# Patient Record
Sex: FEMALE | Race: WHITE | NOT HISPANIC OR LATINO | Employment: OTHER | URBAN - METROPOLITAN AREA
[De-identification: names, ages, dates, MRNs, and addresses within clinical notes are randomized per-mention and may not be internally consistent; named-entity substitution may affect disease eponyms.]

---

## 2017-08-09 ENCOUNTER — GENERIC CONVERSION - ENCOUNTER (OUTPATIENT)
Dept: OTHER | Facility: OTHER | Age: 72
End: 2017-08-09

## 2018-02-02 ENCOUNTER — OFFICE VISIT (OUTPATIENT)
Dept: GASTROENTEROLOGY | Facility: CLINIC | Age: 73
End: 2018-02-02
Payer: MEDICARE

## 2018-02-02 VITALS
TEMPERATURE: 98.2 F | WEIGHT: 133 LBS | HEART RATE: 64 BPM | RESPIRATION RATE: 14 BRPM | DIASTOLIC BLOOD PRESSURE: 64 MMHG | HEIGHT: 67 IN | BODY MASS INDEX: 20.88 KG/M2 | SYSTOLIC BLOOD PRESSURE: 102 MMHG

## 2018-02-02 DIAGNOSIS — R10.13 DYSPEPSIA: Primary | ICD-10-CM

## 2018-02-02 DIAGNOSIS — R10.13 EPIGASTRIC PAIN: ICD-10-CM

## 2018-02-02 PROCEDURE — 99204 OFFICE O/P NEW MOD 45 MIN: CPT | Performed by: INTERNAL MEDICINE

## 2018-02-02 RX ORDER — PANTOPRAZOLE SODIUM 40 MG/1
40 TABLET, DELAYED RELEASE ORAL DAILY
COMMUNITY

## 2018-02-02 RX ORDER — LORATADINE 10 MG/1
10 TABLET ORAL AS NEEDED
COMMUNITY

## 2018-02-02 RX ORDER — FLUVOXAMINE MALEATE 100 MG
100 TABLET ORAL
COMMUNITY

## 2018-02-02 RX ORDER — MINERAL OIL 100 G/100G
1 OIL RECTAL ONCE
COMMUNITY

## 2018-02-02 RX ORDER — ACETAMINOPHEN 325 MG/1
325 TABLET ORAL EVERY 6 HOURS PRN
COMMUNITY

## 2018-02-02 RX ORDER — MEMANTINE HYDROCHLORIDE 10 MG/1
10 TABLET ORAL 2 TIMES DAILY
COMMUNITY

## 2018-02-02 RX ORDER — OXYBUTYNIN CHLORIDE 10 MG/1
10 TABLET, EXTENDED RELEASE ORAL
COMMUNITY

## 2018-02-02 RX ORDER — RIVASTIGMINE 9.5 MG/24H
1 PATCH, EXTENDED RELEASE TRANSDERMAL DAILY
COMMUNITY

## 2018-02-02 NOTE — ASSESSMENT & PLAN NOTE
Nonspecific discomfort when patient is upset and it appears to most likely functional     -continue Protonix and check right upper quadrant ultrasound

## 2018-02-02 NOTE — LETTER
February 2, 2018     [address]  [address]    Patient: Champ Simmons   YOB: 1945   Date of Visit: 2/2/2018       Dear Dr Ravinder Roberson: Thank you for referring Champ Simmons to me for evaluation  Below are my notes for this consultation  If you have questions, please do not hesitate to call me  I look forward to following your patient along with you  Sincerely,        Alcira Arevalo MD        CC: No Recipients  Alcira Arevalo MD  2/2/2018  6:52 PM  Sign at close encounter  Consultation - [address] [facility]  Champ Simmons 1945 female         Chief Complaint:  Nausea and epigastric pain    HPI:  53-year-old female with history of dementia was referred from [facility] because of episodes of epigastric discomfort associated with nausea  Patient was brought in by her POA Ms  Bette Law who is also a RN  She tells me that her symptoms are mostly when she is anxious and under stress  She has been on Protonix regularly  Patient also reports that she gets these episodes on and off  Denies any vomiting  Her appetite is fair  Regular bowel movements, denies any blood or mucus in the stool  REVIEW OF SYSTEMS: Review of Systems   Constitutional: Negative for activity change, appetite change, chills, diaphoresis, fatigue, fever and unexpected weight change  HENT: Negative for ear discharge, ear pain, facial swelling, hearing loss, nosebleeds, sore throat, tinnitus and voice change  Eyes: Negative for pain, discharge, redness, itching and visual disturbance  Respiratory: Negative for apnea, cough, chest tightness, shortness of breath and wheezing  Cardiovascular: Negative for chest pain and palpitations  Gastrointestinal:        As noted in HPI   Endocrine: Negative for cold intolerance, heat intolerance and polyuria  Genitourinary: Negative for difficulty urinating, dysuria, flank pain, hematuria and urgency  Musculoskeletal: Positive for arthralgias  Negative for back pain, gait problem, joint swelling and myalgias  Skin: Negative for rash and wound  Neurological: Negative for dizziness, tremors, seizures, speech difficulty, light-headedness, numbness and headaches  Hematological: Negative for adenopathy  Does not bruise/bleed easily  Psychiatric/Behavioral: Positive for behavioral problems, confusion and dysphoric mood  Negative for agitation  The patient is nervous/anxious  Past Medical History:   Diagnosis Date    Alzheimer disease type 4       History reviewed  No pertinent surgical history  Social History     Social History    Marital status: Single     Spouse name: N/A    Number of children: N/A    Years of education: N/A     Occupational History    Not on file  Social History Main Topics    Smoking status: Never Smoker    Smokeless tobacco: Never Used    Alcohol use No    Drug use: No    Sexual activity: Not on file     Other Topics Concern    Not on file     Social History Narrative    No narrative on file     History reviewed  No pertinent family history  Patient has no known allergies    Current Outpatient Prescriptions   Medication Sig Dispense Refill    acetaminophen (TYLENOL) 325 mg tablet Take 325 mg by mouth every 6 (six) hours as needed for mild pain      bisacodyl (FLEET) 10 MG/30ML ENEM Insert 10 mg into the rectum once      fluvoxaMINE (LUVOX) 100 mg tablet Take 100 mg by mouth daily at bedtime      loratadine (CLARITIN) 10 mg tablet Take 10 mg by mouth as needed for allergies      magnesium hydroxide (MILK OF MAGNESIA) 400 mg/5 mL oral suspension Take 5 mL by mouth daily as needed for constipation      memantine (NAMENDA) 10 mg tablet Take 10 mg by mouth 2 (two) times a day      mineral oil enema Insert 1 enema into the rectum once      oxybutynin (DITROPAN-XL) 10 MG 24 hr tablet Take 10 mg by mouth daily at bedtime      pantoprazole (PROTONIX) 40 mg tablet Take 40 mg by mouth daily      rivastigmine (EXELON) 9 5 mg/24 hr TD 24 hr patch Place 1 patch on the skin daily       No current facility-administered medications for this visit  Blood pressure 102/64, pulse 64, temperature 98 2 °F (36 8 °C), temperature source Tympanic, resp  rate 14, height 5' 7" (1 702 m), weight 60 3 kg (133 lb)  PHYSICAL EXAM: Physical Exam   Constitutional: She is oriented to person, place, and time  She appears well-developed and well-nourished  HENT:   Head: Normocephalic and atraumatic  Nose: Nose normal    Mouth/Throat: Oropharynx is clear and moist    Eyes: Conjunctivae are normal  Right eye exhibits no discharge  Left eye exhibits no discharge  No scleral icterus  Neck: Neck supple  No JVD present  No tracheal deviation present  No thyromegaly present  Cardiovascular: Normal rate, regular rhythm and normal heart sounds  Exam reveals no gallop and no friction rub  No murmur heard  Pulmonary/Chest: Effort normal and breath sounds normal  No respiratory distress  She has no wheezes  She has no rales  She exhibits no tenderness  Abdominal: Soft  Bowel sounds are normal  She exhibits no distension and no mass  There is no tenderness  There is no rebound and no guarding  No hernia  Musculoskeletal: She exhibits no edema, tenderness or deformity  Lymphadenopathy:     She has no cervical adenopathy  Neurological: She is alert and oriented to person, place, and time  Skin: Skin is warm and dry  No rash noted  No erythema  Psychiatric: She has a normal mood and affect   Her behavior is normal  Thought content normal         Lab Results   Component Value Date    WBC 6 40 07/22/2016    HGB 12 2 07/22/2016    HCT 35 8 (L) 07/22/2016    MCV 92 07/22/2016     07/22/2016     Lab Results   Component Value Date    GLUCOSE 99 07/22/2016    CALCIUM 8 3 07/22/2016     07/22/2016    K 3 8 07/22/2016    CO2 31 07/22/2016     07/22/2016    BUN 13 07/22/2016 CREATININE 0 81 07/22/2016     Lab Results   Component Value Date    ALT 21 07/22/2016    AST 20 07/22/2016    ALKPHOS 37 (L) 07/22/2016    BILITOT 0 50 07/22/2016     No results found for: INR, PROTIME    Patient was never admitted  ASSESSMENT & PLAN:    Dyspepsia  Symptoms appear to be most likely functional related to stress and anxiety as per patient's POA  Rule out peptic ulcer disease or gastric erosions which seems to be less likely as she has been on proton pump inhibitors    Possible biliary pathology     -discussed with patient's POA about upper endoscopy evaluation but she is refusing at this time as patient does not want to go through any invasive procedures     -check right upper quadrant ultrasound    -check upper GI barium    -continue Protonix for now but consider changing to Pepcid or Zantac because of the concerns regarding long term side effects        Epigastric pain  Nonspecific discomfort when patient is upset and it appears to most likely functional     -continue Protonix and check right upper quadrant ultrasound

## 2018-02-02 NOTE — PROGRESS NOTES
Consultation - [facility]  Justin Singh 1945 female         Chief Complaint:  Nausea and epigastric pain    HPI:  74-year-old female with history of dementia was referred from [facility] because of episodes of epigastric discomfort associated with nausea  Patient was brought in by her POA Ms Timoteo Nunez who is also a RN  She tells me that her symptoms are mostly when she is anxious and under stress  She has been on Protonix regularly  Patient also reports that she gets these episodes on and off  Denies any vomiting  Her appetite is fair  Regular bowel movements, denies any blood or mucus in the stool  REVIEW OF SYSTEMS: Review of Systems   Constitutional: Negative for activity change, appetite change, chills, diaphoresis, fatigue, fever and unexpected weight change  HENT: Negative for ear discharge, ear pain, facial swelling, hearing loss, nosebleeds, sore throat, tinnitus and voice change  Eyes: Negative for pain, discharge, redness, itching and visual disturbance  Respiratory: Negative for apnea, cough, chest tightness, shortness of breath and wheezing  Cardiovascular: Negative for chest pain and palpitations  Gastrointestinal:        As noted in HPI   Endocrine: Negative for cold intolerance, heat intolerance and polyuria  Genitourinary: Negative for difficulty urinating, dysuria, flank pain, hematuria and urgency  Musculoskeletal: Positive for arthralgias  Negative for back pain, gait problem, joint swelling and myalgias  Skin: Negative for rash and wound  Neurological: Negative for dizziness, tremors, seizures, speech difficulty, light-headedness, numbness and headaches  Hematological: Negative for adenopathy  Does not bruise/bleed easily  Psychiatric/Behavioral: Positive for behavioral problems, confusion and dysphoric mood  Negative for agitation  The patient is nervous/anxious           Past Medical History:   Diagnosis Date    Alzheimer disease type 4       History reviewed  No pertinent surgical history  Social History     Social History    Marital status: Single     Spouse name: N/A    Number of children: N/A    Years of education: N/A     Occupational History    Not on file  Social History Main Topics    Smoking status: Never Smoker    Smokeless tobacco: Never Used    Alcohol use No    Drug use: No    Sexual activity: Not on file     Other Topics Concern    Not on file     Social History Narrative    No narrative on file     History reviewed  No pertinent family history  Patient has no known allergies  Current Outpatient Prescriptions   Medication Sig Dispense Refill    acetaminophen (TYLENOL) 325 mg tablet Take 325 mg by mouth every 6 (six) hours as needed for mild pain      bisacodyl (FLEET) 10 MG/30ML ENEM Insert 10 mg into the rectum once      fluvoxaMINE (LUVOX) 100 mg tablet Take 100 mg by mouth daily at bedtime      loratadine (CLARITIN) 10 mg tablet Take 10 mg by mouth as needed for allergies      magnesium hydroxide (MILK OF MAGNESIA) 400 mg/5 mL oral suspension Take 5 mL by mouth daily as needed for constipation      memantine (NAMENDA) 10 mg tablet Take 10 mg by mouth 2 (two) times a day      mineral oil enema Insert 1 enema into the rectum once      oxybutynin (DITROPAN-XL) 10 MG 24 hr tablet Take 10 mg by mouth daily at bedtime      pantoprazole (PROTONIX) 40 mg tablet Take 40 mg by mouth daily      rivastigmine (EXELON) 9 5 mg/24 hr TD 24 hr patch Place 1 patch on the skin daily       No current facility-administered medications for this visit  Blood pressure 102/64, pulse 64, temperature 98 2 °F (36 8 °C), temperature source Tympanic, resp  rate 14, height 5' 7" (1 702 m), weight 60 3 kg (133 lb)  PHYSICAL EXAM: Physical Exam   Constitutional: She is oriented to person, place, and time  She appears well-developed and well-nourished  HENT:   Head: Normocephalic and atraumatic     Nose: Nose normal    Mouth/Throat: Oropharynx is clear and moist    Eyes: Conjunctivae are normal  Right eye exhibits no discharge  Left eye exhibits no discharge  No scleral icterus  Neck: Neck supple  No JVD present  No tracheal deviation present  No thyromegaly present  Cardiovascular: Normal rate, regular rhythm and normal heart sounds  Exam reveals no gallop and no friction rub  No murmur heard  Pulmonary/Chest: Effort normal and breath sounds normal  No respiratory distress  She has no wheezes  She has no rales  She exhibits no tenderness  Abdominal: Soft  Bowel sounds are normal  She exhibits no distension and no mass  There is no tenderness  There is no rebound and no guarding  No hernia  Musculoskeletal: She exhibits no edema, tenderness or deformity  Lymphadenopathy:     She has no cervical adenopathy  Neurological: She is alert and oriented to person, place, and time  Skin: Skin is warm and dry  No rash noted  No erythema  Psychiatric: She has a normal mood and affect  Her behavior is normal  Thought content normal         Lab Results   Component Value Date    WBC 6 40 07/22/2016    HGB 12 2 07/22/2016    HCT 35 8 (L) 07/22/2016    MCV 92 07/22/2016     07/22/2016     Lab Results   Component Value Date    GLUCOSE 99 07/22/2016    CALCIUM 8 3 07/22/2016     07/22/2016    K 3 8 07/22/2016    CO2 31 07/22/2016     07/22/2016    BUN 13 07/22/2016    CREATININE 0 81 07/22/2016     Lab Results   Component Value Date    ALT 21 07/22/2016    AST 20 07/22/2016    ALKPHOS 37 (L) 07/22/2016    BILITOT 0 50 07/22/2016     No results found for: INR, PROTIME    Patient was never admitted  ASSESSMENT & PLAN:    Dyspepsia  Symptoms appear to be most likely functional related to stress and anxiety as per patient's POA  Rule out peptic ulcer disease or gastric erosions which seems to be less likely as she has been on proton pump inhibitors    Possible biliary pathology     -discussed with patient's POA about upper endoscopy evaluation but she is refusing at this time as patient does not want to go through any invasive procedures     -check right upper quadrant ultrasound    -check upper GI barium    -continue Protonix for now but consider changing to Pepcid or Zantac because of the concerns regarding long term side effects        Epigastric pain  Nonspecific discomfort when patient is upset and it appears to most likely functional     -continue Protonix and check right upper quadrant ultrasound

## 2018-02-02 NOTE — ASSESSMENT & PLAN NOTE
Symptoms appear to be most likely functional related to stress and anxiety as per patient's POA  Rule out peptic ulcer disease or gastric erosions which seems to be less likely as she has been on proton pump inhibitors    Possible biliary pathology     -discussed with patient's POA about upper endoscopy evaluation but she is refusing at this time as patient does not want to go through any invasive procedures     -check right upper quadrant ultrasound    -check upper GI barium    -continue Protonix for now but consider changing to Pepcid or Zantac because of the concerns regarding long term side effects

## 2018-02-12 ENCOUNTER — TELEPHONE (OUTPATIENT)
Dept: GASTROENTEROLOGY | Facility: CLINIC | Age: 73
End: 2018-02-12

## 2018-02-12 NOTE — PROGRESS NOTES
Called to asked of her status for the test requested by Dr Alex De Guzman  PT has not responded to any of my calls  02/12/18 [id number]

## 2018-09-26 ENCOUNTER — HOSPITAL ENCOUNTER (OUTPATIENT)
Facility: AMBULARY SURGERY CENTER | Age: 73
Setting detail: OUTPATIENT SURGERY
End: 2018-09-26
Attending: SPECIALIST | Admitting: SPECIALIST
Payer: MEDICARE

## 2018-09-26 VITALS
TEMPERATURE: 98.1 F | WEIGHT: 135 LBS | RESPIRATION RATE: 16 BRPM | DIASTOLIC BLOOD PRESSURE: 58 MMHG | SYSTOLIC BLOOD PRESSURE: 112 MMHG | HEART RATE: 61 BPM | OXYGEN SATURATION: 93 % | HEIGHT: 69 IN | BODY MASS INDEX: 19.99 KG/M2

## 2018-09-26 DIAGNOSIS — C44.42 SQUAMOUS CELL CARCINOMA OF SKIN OF SCALP AND NECK: ICD-10-CM

## 2018-09-26 PROBLEM — L81.9: Status: ACTIVE | Noted: 2018-09-26

## 2018-09-26 PROCEDURE — 88305 TISSUE EXAM BY PATHOLOGIST: CPT | Performed by: PATHOLOGY

## 2018-09-26 PROCEDURE — 88312 SPECIAL STAINS GROUP 1: CPT | Performed by: PATHOLOGY

## 2018-09-26 RX ORDER — LIDOCAINE HYDROCHLORIDE AND EPINEPHRINE 10; 10 MG/ML; UG/ML
INJECTION, SOLUTION INFILTRATION; PERINEURAL AS NEEDED
Status: DISCONTINUED | OUTPATIENT
Start: 2018-09-26 | End: 2018-09-26 | Stop reason: HOSPADM

## 2018-09-26 NOTE — OP NOTE
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 [C44 42]    Procedure(s): MINOR PROCEDURE - EXCISION OF SQUAMOUS CELL CA ON SCALP    Surgeon(s):  Donna Brown MD    Anesthesia:  Local    Staff:   Circulator: Edna Robles RN    Operative Findings:  Nonhealing ulcerated lesion over the forehead for 1 year increasing in size  Size Of lesion   2 cm x  0 5 Cm     OPERATIVE TECHNIQUE    Jyoti Gamez was identified by me  Proper detailed consent was obtained from patient risk and benefits were explained to patient and family in detail prior to coming to Operating Room  Site was marked  Jyoti Gamez was given pre-operative antibiotics  Body mass index is 19 94 kg/m²  Jyoti Gamez is ASA 2 and Fire risk- 1  Jyoti Gamez was re-identified in the OR  Site was identified and detailed timeout was performed with Anesthesia and OR staff  The lesion was marked the local anesthetic lidocaine with epinephrine approximately 2 cc was injected the under the lesion with 1 cm clear margin of the lesion was excised with the scalpel  The skin subcutaneous cutaneous tissue was cut along the line of incision  The lesion was completely excised   Proper hemostasis was achieved     Resultant wound was then closed in single layer approximated with nylon 4-0 sutures    No active bleeding was noted at the end of the procedure    Jyoti Gamez tolerated the procedure well, remain stable during and after the procedure  At the end of the procedure No active bleeding was noted and all the instruments, needle and sponge counts were noted to be correct  Sterile dresing was applied and patient was transfered to recovery area in stable condition  Estimated Blood Loss:  Minimal    Specimens:    Order Name Source Comment Collection Info Order Time   TISSUE EXAM Head  Collected By: Vandana Patel MD 9/26/2018 10:32 AM         Drains:   nil    Complications:  None    Total OR Time  0 Hr 16 Min 0 Sec   or    Vandana Patel MD MS Taran Font    Date: 9/26/2018  Time: 10:48 AM    Copy to PCP: Yen Clark MD

## 2018-09-26 NOTE — DISCHARGE INSTRUCTIONS
Scalp Lesion   WHAT YOU NEED TO KNOW:   What do I need to know about a scalp lesion? A scalp lesion is a bump, blister, growth, or scaly patch  A lesion can also be an area of skin with a different color or texture than the skin around it  You can have a lesion anywhere on your scalp  It may itch, bleed, hurt, or be filled with fluid  Your hair may break off or come out around your scalp lesion  What can cause a scalp lesion? The cause of a scalp lesion may not be known, or it may be caused by any of the following:  · Injury, such as a hit on the head    · Bacterial, viral, or fungal infections, or cancer    · Chemicals in shampoos or hair products    · Vitamin deficiencies, inflammatory skin conditions, or poor circulation of blood  How is a scalp lesion diagnosed? A scalp lesion is diagnosed by your healthcare provider looking at your scalp  A sample of fluid from your lesion may show bacteria, a virus, or a fungus  A biopsy may show if your scalp lesion is caused by cancer  How are scalp lesions treated? Treatment depends on the cause of your skin lesion  You may need medicine to treat a fungal or bacterial infection  You may also need medicated shampoos and creams to treat your scalp lesions  You may need to have light therapy  Surgery may be needed to remove the lesion  How can I manage my scalp lesion? · Use a soft brush  Brush your hair gently to keep your scalp lesion from bleeding or becoming irritated  · Do not scratch your scalp lesion  You may cause your scalp to bleed  You may also spread bacteria or infection to other parts of your scalp  · Do not use chemicals or colors on your hair  Color and chemical treatments to your hair may make your scalp lesion worse  Wait until your scalp lesion is healed or until your healthcare provider says it is okay  · Use a mild shampoo to wash your hair    If you have not been told to use a medicated shampoo, ask your healthcare provider which shampoo is best     · Always wear sunscreen or a wide-brimmed hat when out in the sun  This will help prevent skin cancer on your scalp  When should I contact my healthcare provider? · Your scalp lesion gets worse, even with treatment  · Your lesion grows, is painful, or starts to drain fluid  · You have questions or concerns about your condition or care  CARE AGREEMENT:   You have the right to help plan your care  Learn about your health condition and how it may be treated  Discuss treatment options with your caregivers to decide what care you want to receive  You always have the right to refuse treatment  The above information is an  only  It is not intended as medical advice for individual conditions or treatments  Talk to your doctor, nurse or pharmacist before following any medical regimen to see if it is safe and effective for you  © 2017 [employer]  Information is for End User's use only and may not be sold, redistributed or otherwise used for commercial purposes  All illustrations and images included in CareNotes® are the copyrighted property of A D A M , Inc  or Steven Bustillo  Please follow carefully all instructions checked below  Salvador Holloway  Pre-op Diagnosis:   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 [C44 42]  Post-op Diagnosis:  * No post-op diagnosis entered *   Post-Op Diagnosis Codes:     * 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 [C44 42]  Procedure(s): MINOR PROCEDURE - EXCISION OF SQUAMOUS CELL CA ON SCALP  Surgeon(s):  Vinh Garcia MD    1  Diet:  · Resume your normal diet  Avoid red meat eat lots of yogurt  2  Medications:  · Home medications reviewed  Resume pre-operative medications unless noted below  · Prescription sent home with patient and Prescription sent over to pharmacy  · See after visit summary for reconciled discharge medications provided to patient and family      3  Activities:  · Do NOT make important personal or business decisions for 24 hours  · Do NOT drive or operate machinery for 7 days  · While taking pain medication, do not drive and be careful as you walk or climb stairs  · Limit your activities for 3 weeks  Do not engage in sports, heavy work or heavy  lifting until your physician gives you permission  4  Wound Care:  · Change dressings as necessary after 2 days  · Keep dressings dry  5  Special Instructions:  · Full weight bearing  6  Bathing:  · May shower after 2 days  7  When to Call:       Call if fever, Nausea, vomiting, Constipation not feeling well, or wound infection,      bleeding,reddness and excessive pain,  8  Follow-up Care:  · Make an appointment to see MD Minerva Garcia[address]  in 10 days for Follow up   Please Call Office  [phone] for appointment,   · Call if fever, Nausea, vomiting, Constipation not feeling well, or wound infection, bleeding,reddness and excessive pain,    Donna Brown MD [facility] FACS  01/03/18  2:24 PM

## 2020-04-20 ENCOUNTER — TELEPHONE (OUTPATIENT)
Dept: BEHAVIORAL/MENTAL HEALTH CLINIC | Facility: CLINIC | Age: 75
End: 2020-04-20

## 2021-09-16 ENCOUNTER — TELEPHONE (OUTPATIENT)
Dept: FAMILY MEDICINE CLINIC | Facility: CLINIC | Age: 76
End: 2021-09-16

## 2021-09-16 NOTE — TELEPHONE ENCOUNTER
Fax received with minimal info- states she has a R knee abrasion  Unsure what to do with this other than alert you  Will place in folder for you      Thank you

## 2022-01-21 ENCOUNTER — TELEPHONE (OUTPATIENT)
Dept: FAMILY MEDICINE CLINIC | Facility: CLINIC | Age: 77
End: 2022-01-21

## 2022-01-21 NOTE — TELEPHONE ENCOUNTER
We got a physician order to be signed from [address] [place] [address]    Copy scanned/media    Original left in blue

## 2022-10-17 ENCOUNTER — TELEPHONE (OUTPATIENT)
Dept: FAMILY MEDICINE CLINIC | Facility: CLINIC | Age: 77
End: 2022-10-17

## 2022-10-17 NOTE — TELEPHONE ENCOUNTER
[place] Practitioner order for life sustaining treatment     Scanned into encounter    Placed in white clinical folder    Addressed to Dr Lakshmi Luevano

## 2023-01-20 ENCOUNTER — TELEPHONE (OUTPATIENT)
Dept: FAMILY MEDICINE CLINIC | Facility: CLINIC | Age: 78
End: 2023-01-20

## 2023-02-01 ENCOUNTER — TELEPHONE (OUTPATIENT)
Dept: FAMILY MEDICINE CLINIC | Facility: CLINIC | Age: 78
End: 2023-02-01

## 2023-02-01 NOTE — TELEPHONE ENCOUNTER
Fax received from [address]endoza  Copy of form scanned into encounter  Placed in blue team folder at nurse station

## 2023-02-03 NOTE — TELEPHONE ENCOUNTER
Completed form has been faxed to the number listed below; and placed in "TO BE SCANNED [place] [id number]"      [phone]

## 2023-02-27 ENCOUNTER — TELEPHONE (OUTPATIENT)
Dept: FAMILY MEDICINE CLINIC | Facility: CLINIC | Age: 78
End: 2023-02-27

## 2023-03-01 ENCOUNTER — TELEPHONE (OUTPATIENT)
Dept: FAMILY MEDICINE CLINIC | Facility: CLINIC | Age: 78
End: 2023-03-01

## 2023-03-01 NOTE — TELEPHONE ENCOUNTER
Fax received from [employer]  Copy of form scanned into encounter  Placed in blue team folder at nurse station

## 2023-03-03 ENCOUNTER — TELEPHONE (OUTPATIENT)
Dept: FAMILY MEDICINE CLINIC | Facility: CLINIC | Age: 78
End: 2023-03-03

## 2023-03-10 ENCOUNTER — TELEPHONE (OUTPATIENT)
Dept: FAMILY MEDICINE CLINIC | Facility: CLINIC | Age: 78
End: 2023-03-10

## 2023-03-15 NOTE — TELEPHONE ENCOUNTER
Completed form has been faxed to the number listed below; and placed in "TO BE SCANNED [place] [address]"    [phone]

## 2023-04-04 ENCOUNTER — TELEPHONE (OUTPATIENT)
Dept: FAMILY MEDICINE CLINIC | Facility: CLINIC | Age: 78
End: 2023-04-04

## 2023-04-06 ENCOUNTER — TELEPHONE (OUTPATIENT)
Dept: FAMILY MEDICINE CLINIC | Facility: CLINIC | Age: 78
End: 2023-04-06

## 2023-04-06 NOTE — TELEPHONE ENCOUNTER
Form found in [facility]  No encounter has been created  Faxed to the number listed below;       [phone]    Copy is attached to this encounter

## 2023-04-07 ENCOUNTER — TELEPHONE (OUTPATIENT)
Dept: FAMILY MEDICINE CLINIC | Facility: CLINIC | Age: 78
End: 2023-04-07

## 2023-04-26 ENCOUNTER — TELEPHONE (OUTPATIENT)
Dept: FAMILY MEDICINE CLINIC | Facility: CLINIC | Age: 78
End: 2023-04-26

## 2023-05-19 ENCOUNTER — TELEPHONE (OUTPATIENT)
Dept: FAMILY MEDICINE CLINIC | Facility: CLINIC | Age: 78
End: 2023-05-19

## 2023-05-19 NOTE — TELEPHONE ENCOUNTER
Dr Emily Spaulding form  [facility] [place] team clinical folder

## 2023-05-22 ENCOUNTER — TELEPHONE (OUTPATIENT)
Dept: FAMILY MEDICINE CLINIC | Facility: CLINIC | Age: 78
End: 2023-05-22

## 2023-05-22 NOTE — TELEPHONE ENCOUNTER
Elaine Ponce from [facility] is requesting PCP sign Death certificate online, case number is listed below;          Case # [id number]

## 2023-05-26 ENCOUNTER — TELEPHONE (OUTPATIENT)
Dept: FAMILY MEDICINE CLINIC | Facility: CLINIC | Age: 78
End: 2023-05-26

## 2023-06-23 ENCOUNTER — TELEPHONE (OUTPATIENT)
Dept: FAMILY MEDICINE CLINIC | Facility: CLINIC | Age: 78
End: 2023-06-23

## (undated) DEVICE — NEEDLE 25G X 1 1/2

## (undated) DEVICE — NEEDLE 18 G X 1 1/2

## (undated) DEVICE — INTENDED FOR TISSUE SEPARATION, AND OTHER PROCEDURES THAT REQUIRE A SHARP SURGICAL BLADE TO PUNCTURE OR CUT.: Brand: BARD-PARKER ®  SAFETY SCALPED

## (undated) DEVICE — LABEL STERILE (RSC) (2-SENSOR CAINE 2-LIDOCAINE 2-HEPARIN)

## (undated) DEVICE — SKIN MARKER DUAL TIP WITH RULER CAP, FLEXIBLE RULER AND LABELS: Brand: DEVON

## (undated) DEVICE — SMALL NEEDLE COUNTER NEST

## (undated) DEVICE — SUT NYLON 4-0 P-3 18 IN 699G

## (undated) DEVICE — GLOVE SRG BIOGEL 7

## (undated) DEVICE — ADHESIVE SKN CLSR HISTOACRYL FLEX 0.5ML LF